# Patient Record
Sex: MALE | Race: WHITE
[De-identification: names, ages, dates, MRNs, and addresses within clinical notes are randomized per-mention and may not be internally consistent; named-entity substitution may affect disease eponyms.]

---

## 2019-10-24 ENCOUNTER — HOSPITAL ENCOUNTER (EMERGENCY)
Dept: HOSPITAL 25 - UCEAST | Age: 59
Discharge: HOME | End: 2019-10-24
Payer: COMMERCIAL

## 2019-10-24 VITALS — SYSTOLIC BLOOD PRESSURE: 128 MMHG | DIASTOLIC BLOOD PRESSURE: 82 MMHG

## 2019-10-24 DIAGNOSIS — H10.9: Primary | ICD-10-CM

## 2019-10-24 DIAGNOSIS — Z88.1: ICD-10-CM

## 2019-10-24 PROCEDURE — 99202 OFFICE O/P NEW SF 15 MIN: CPT

## 2019-10-24 PROCEDURE — G0463 HOSPITAL OUTPT CLINIC VISIT: HCPCS

## 2019-10-24 NOTE — UC
Eye Complaint HPI





- HPI Summary


HPI Summary: 





59-year-old male who started having some left eye redness with purulent 

drainage today.  He denies any injury to his eye nor does he feel any sensation 

of foreign body.  He does not wear contact lenses.





- History of Current Complaint


Chief Complaint: UCEye


Stated Complaint: EYE IRRITATION


Time Seen by Provider: 10/24/19 16:14


Hx Obtained From: Patient


Onset/Duration: Gradual Onset


Timing: Constant


Severity Initially: Mild


Severity Currently: Mild


Pain Intensity: 1


Location of Injury: Other


Aggravating Factor(s): Nothing


Alleviating Factor(s): Nothing


Associated Signs And Symptoms: Positive: Drainage (Purulent)





- Allergies/Home Medications


Allergies/Adverse Reactions: 


 Allergies











Allergy/AdvReac Type Severity Reaction Status Date / Time


 


cefadroxil Allergy  Rash Verified 10/24/19 16:24


 


ciprofloxacin Allergy  Rash Verified 10/24/19 16:24











Home Medications: 


 Home Medications





Hyoscyamine Sulfate [Levsin] 0.125 mg PO Q4H PRN 10/24/19 [History Confirmed 10/

24/19]


Sinequan 10 mg PO BEDTIME 10/24/19 [History Confirmed 10/24/19]


metroNIDAZOLE [Metrocream] 0.75 % TOPICAL BID 10/24/19 [History Confirmed 10/24/

19]











PMH/Surg Hx/FS Hx/Imm Hx


Previously Healthy: Yes





- Surgical History


Surgical History: Yes


Surgery Procedure, Year, and Place: sigmoidectomy, tonsillectomy





- Family History


Known Family History: Positive: Non-Contributory





- Social History


Occupation: Employed Full-time


Alcohol Use: None


Substance Use Type: None


Smoking Status (MU): Never Smoked Tobacco





Review of Systems


All Other Systems Reviewed And Are Negative: Yes


Eyes: Positive: Drainage, Eye Redness - Left eye redness with purulent drainage 

today throughout the day.


Is Patient Immunocompromised?: No





Physical Exam


Triage Information Reviewed: Yes


Appearance: Well-Appearing, No Pain Distress, Well-Nourished


Vital Signs: 


 Initial Vital Signs











Temp  97.8 F   10/24/19 16:18


 


Pulse  78   10/24/19 16:18


 


Resp  16   10/24/19 16:18


 


BP  128/82   10/24/19 16:18


 


Pulse Ox  100   10/24/19 16:18











Vital Signs Reviewed: Yes


Eyes: Positive: Conjunctiva Inflamed - Ary, EOMI, minimal yellow purulent 

drainage inner canthus of left eye, conjunctiva and sclera injected.


ENT: Positive: Pharynx normal, TMs normal, Uvula midline


Neck: Positive: Supple, Nontender, No Lymphadenopathy


Musculoskeletal Exam: Normal


Neurological Exam: Normal


Psychological Exam: Normal


Skin Exam: Normal





Eye Complaint Course/Dx





- Course


Course Of Treatment: 





I'm going to treat the patient for conjunctivitis.  He is to practice good 

handwashing.  He is to follow-up with the ophthalmologist in 3 or 4 days if no 

improvement, or sooner if worsening symptoms.





- Differential Dx/Diagnosis


Provider Diagnosis: 


 Left conjunctivitis








Discharge ED





- Sign-Out/Discharge


Documenting (check all that apply): Patient Departure


All imaging exams completed and their final reports reviewed: No Studies





- Discharge Plan


Condition: Good


Disposition: HOME


Prescriptions: 


Tobramycin 0.3% OPHTH.SOL* 1 drop LEFT EYE Q4H 7 Days #1 btl


Patient Education Materials:  Conjunctivitis (ED)


Referrals: 


No Primary Care Phys,NOPCP [Primary Care Provider] - 


Additional Instructions: 


Follow-up with your primary care provider or an ophthalmologist if no 

improvement in 3 or 4 days.  Good handwashing if you touch her face or eyes.





- Billing Disposition and Condition


Condition: GOOD


Disposition: Home





- Attestation Statements


Provider Attestation: 





Per institutional requirements, I have reviewed the chart, however, I was not 

consulted specifically or made aware of this patient by the  midlevel provider.

  I did not personally evaluate, interact with , or disposition  this patient.